# Patient Record
Sex: FEMALE | Race: WHITE | NOT HISPANIC OR LATINO | Employment: PART TIME | URBAN - NONMETROPOLITAN AREA
[De-identification: names, ages, dates, MRNs, and addresses within clinical notes are randomized per-mention and may not be internally consistent; named-entity substitution may affect disease eponyms.]

---
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
## 2025-03-13 ENCOUNTER — HOSPITAL ENCOUNTER (OUTPATIENT)
Dept: RADIOLOGY | Facility: CLINIC | Age: 20
Discharge: HOME | End: 2025-03-13
Payer: COMMERCIAL

## 2025-03-13 ENCOUNTER — OFFICE VISIT (OUTPATIENT)
Dept: URGENT CARE | Facility: CLINIC | Age: 20
End: 2025-03-13
Payer: COMMERCIAL

## 2025-03-13 VITALS
OXYGEN SATURATION: 98 % | DIASTOLIC BLOOD PRESSURE: 78 MMHG | HEART RATE: 79 BPM | RESPIRATION RATE: 15 BRPM | TEMPERATURE: 97.2 F | SYSTOLIC BLOOD PRESSURE: 116 MMHG

## 2025-03-13 DIAGNOSIS — R11.10 INTERMITTENT VOMITING: ICD-10-CM

## 2025-03-13 DIAGNOSIS — R10.2 PELVIC PAIN IN FEMALE: ICD-10-CM

## 2025-03-13 DIAGNOSIS — N83.202 LEFT OVARIAN CYST: ICD-10-CM

## 2025-03-13 DIAGNOSIS — K76.0 HEPATIC STEATOSIS: ICD-10-CM

## 2025-03-13 DIAGNOSIS — N92.6 IRREGULAR MENSES: ICD-10-CM

## 2025-03-13 DIAGNOSIS — R10.2 PELVIC PAIN IN FEMALE: Primary | ICD-10-CM

## 2025-03-13 LAB — PREGNANCY TEST URINE, POC: NEGATIVE

## 2025-03-13 PROCEDURE — 76705 ECHO EXAM OF ABDOMEN: CPT | Performed by: STUDENT IN AN ORGANIZED HEALTH CARE EDUCATION/TRAINING PROGRAM

## 2025-03-13 PROCEDURE — 76856 US EXAM PELVIC COMPLETE: CPT

## 2025-03-13 PROCEDURE — 99214 OFFICE O/P EST MOD 30 MIN: CPT | Mod: 25 | Performed by: PHYSICIAN ASSISTANT

## 2025-03-13 PROCEDURE — 76705 ECHO EXAM OF ABDOMEN: CPT

## 2025-03-13 PROCEDURE — 81025 URINE PREGNANCY TEST: CPT | Performed by: PHYSICIAN ASSISTANT

## 2025-03-13 RX ORDER — PROMETHAZINE HYDROCHLORIDE AND DEXTROMETHORPHAN HYDROBROMIDE 6.25; 15 MG/5ML; MG/5ML
5 SYRUP ORAL 4 TIMES DAILY PRN
Qty: 118 ML | Refills: 0 | Status: SHIPPED | OUTPATIENT
Start: 2025-03-13 | End: 2025-03-20

## 2025-03-13 NOTE — PROGRESS NOTES
[facility] URGENT CARE KYLE NOTE:      Name: Jacques Wyatt, 19 y.o.    CSN:[id number]   MRN:[id number]    PCP: RUBIO John-CNP    ALL:    Allergies   Allergen Reactions    Propranolol Dizziness       History:    Chief Complaint: sinus congestion, Sore Throat, and Vomiting (X this morning)    Encounter Date: 3/13/2025  6:12 PM      HPI: The history was obtained from the patient. Jacques is a 19 y.o. female, who presents with a chief complaint of sore throat, sinus congestion, headache, myalgia, and fatigue since last night, worse today. Took a dose of Dayquil today with some improvement. No known sick contacts. Denies dysphagia, sinus pain, ear pain, vision changes, cough, chest congestion, chest pain, and dyspnea.     She reports 4 episodes of non-bloody emesis today with some lower abdominal pain but denies stooling changes. Nausea mostly resolved. She states she has spontaneous emesis occasionally accompanied by vague abdominal pain every few weeks since 5 months ago (per record she's had presentations for N/V since 2023).      It is usually precipitated by eating or seeing food, but she denies specific food/food group triggers and she also has some effect when she's fasting. She was seen for vomiting once in the ED 10/2024 and referred to GI at [facility], but she did not follow-up as there was no call placed to her despite her attempting to reach out to the GI specialist at [facility].     She does endorse smoking marijuana every night to help with insomnia and taking 400-800mg ibuprofen per day for chronic headaches. She denies black tarry stools, hematochezia or melena.    No history of abdominal surgeries. Admits to irregular periods. Has not yet seen an OBGYN & isn't sexually active. She was on Depo-provera, but her mother encouraged her to discontinue given the SE profile. She had her last injection at the end of 2024, she spotted for 2d in February, but she hasn't had a 5d menstrual  cycle yet.     In the last year, she was able to lose 50lbs & she is observing her diet, drinking 2% milk & she's occasionally eating sandwiches with cheddar cheese singles. She does eat more chicken than beef.     PMHx:    Past Medical History:   Diagnosis Date    Allergic conjunctivitis of both eyes 02/10/2023    Allergies 02/10/2023    Cannabis use disorder     Insomnia               Current Outpatient Medications   Medication Sig Dispense Refill    acetaminophen (TylenoL) 325 mg tablet Take 1 tablet (325 mg) by mouth. (Patient not taking: Reported on 3/13/2025)      fluticasone (Flonase) 50 mcg/actuation nasal spray Administer 1 spray into each nostril once daily. Shake gently. Before first use, prime pump. After use, clean tip and replace cap. (Patient not taking: Reported on 3/13/2025) 16 g 0    medroxyprogesterone acetate (DEPO-PROVERA IM) Inject into the shoulder, thigh, or buttocks. (Patient not taking: Reported on 3/13/2025)      naproxen (Naprosyn) 500 mg tablet Take 1 tablet (500 mg) by mouth 2 times daily (morning and late afternoon). (Patient not taking: Reported on 3/13/2025)      pantoprazole (ProtoNix) 40 mg EC tablet Take 1 tablet (40 mg) by mouth once daily. Do not crush, chew, or split. (Patient not taking: Reported on 3/13/2025) 30 tablet 5    promethazine-DM (Phenergan-DM) 6.25-15 mg/5 mL syrup Take 5 mL by mouth 4 times a day as needed for cough for up to 7 days. 118 mL 0     No current facility-administered medications for this visit.         PMSx:    Past Surgical History:   Procedure Laterality Date    OTHER SURGICAL HISTORY  08/13/2021    Tonsillectomy    OTHER SURGICAL HISTORY  08/13/2021    Adenoidectomy    OTHER SURGICAL HISTORY  04/08/2022    [place] tooth extraction       Fam Hx:   Family History   Problem Relation Name Age of Onset    Other (cerebrovascular acciden) Other grandfather     Hypertension Other grandfather     Diabetes Other grandmother     Hypertension Other grandmother      Skin cancer Other grandmother     Hypertension Other aunt     Hypertension Other uncle        SOC. Hx:     Social History     Socioeconomic History    Marital status: Single     Spouse name: Not on file    Number of children: Not on file    Years of education: Not on file    Highest education level: Not on file   Occupational History    Not on file   Tobacco Use    Smoking status: Never    Smokeless tobacco: Never   Vaping Use    Vaping status: Every Day    Substances: Nicotine    Devices: Disposable   Substance and Sexual Activity    Alcohol use: Never    Drug use: Yes     Types: Marijuana    Sexual activity: Not Currently   Other Topics Concern    Not on file   Social History Narrative    Not on file     Social Drivers of Health     Financial Resource Strain: Not on file   Food Insecurity: Not on file   Transportation Needs: Not on file   Physical Activity: Not on file   Stress: Not on file   Social Connections: Not on file   Intimate Partner Violence: Not on file   Housing Stability: Not on file         Vitals:    03/13/25 1015   BP: 116/78   Pulse: 79   Resp: 15   Temp: 36.2 °C (97.2 °F)   SpO2: 98%                Physical Exam  Vitals reviewed.   Constitutional:       Appearance: Normal appearance. She is normal weight.   HENT:      Head: Normocephalic and atraumatic.      Right Ear: Ear canal and external ear normal.      Left Ear: Ear canal and external ear normal.      Ears:      Comments: TM not visualized d/t cerumen     Nose: Nose normal.      Comments: No sinus tenderness     Mouth/Throat:      Mouth: Mucous membranes are moist.      Pharynx: No oropharyngeal exudate.      Comments: Minimal erythema of the posterior oropharynx  Eyes:      General: No scleral icterus.     Conjunctiva/sclera: Conjunctivae normal.   Cardiovascular:      Rate and Rhythm: Normal rate and regular rhythm.      Heart sounds: Normal heart sounds.   Pulmonary:      Effort: Pulmonary effort is normal.      Breath sounds: Normal  breath sounds.   Abdominal:      General: Bowel sounds are normal. There is no distension.      Palpations: Abdomen is soft. There is no mass.      Tenderness: There is abdominal tenderness in the suprapubic area and left lower quadrant. There is no guarding or rebound.      Comments: Negative heel strike  Negative peritoneal signs   Musculoskeletal:         General: Normal range of motion.      Cervical back: Normal range of motion.   Skin:     General: Skin is warm and dry.      Capillary Refill: Capillary refill takes less than 2 seconds.      Coloration: Skin is not jaundiced.   Neurological:      Mental Status: She is alert and oriented to person, place, and time.   Psychiatric:         Mood and Affect: Mood normal.         Behavior: Behavior normal.           LABORATORY @ RADIOLOGICAL IMAGING (if done):     Results for orders placed or performed in visit on 03/13/25 (from the past 24 hours)   POCT pregnancy, urine manually resulted   Result Value Ref Range    Preg Test, Ur Negative Negative   Interpreted By:  Cachorro Katz,   STUDY:  US PELVIS; ;  3/13/2025 12:02 pm      INDICATION:  Signs/Symptoms:pelvic pain, irregular menses.      COMPARISON:  None.      ACCESSION NUMBER(S):  [id number]      ORDERING CLINICIAN:  SOFYA ORDOÑEZ      TECHNIQUE:  Multiple transabdominal sonographic images of the pelvis were  performed. The patient declined transvaginal imaging.      FINDINGS:  The uterus measures 8.0 x 2.3 x 3.8 cm in diameter. The double wall  endometrial thickness is 4 mm. There is no fluid distention of the  endometrial cavity. There is no sign of uterine mass.      There is no evidence of free pelvic fluid.      The right ovary measures 2.6 x 1.4 x 2.8 cm in diameter. There is no  right ovarian mass. Doppler interrogation of the right ovary  demonstrates normal arterial activity.      The left ovary measures 3.4 x 2.0 x 2.5 cm in diameter and contains a  1.5 x 1.1 x 1.9 cm cyst or follicle as  well as multiple smaller  follicles. Doppler interrogation of the left ovary demonstrates  normal arterial activity.      IMPRESSION:  1.9 cm left ovarian cyst or follicle.      The remainder of the pelvis is unremarkable.          MACRO:  None      Signed by: Cachorro Katz 3/13/2025 12:35 PM  Dictation workstation:   [id number]      Interpreted By:  Jake Cunningham,   STUDY:  US ABDOMEN LIMITED;  3/13/2025 11:36 am      INDICATION:  Signs/Symptoms:RUQ/epigastric pain with frequent nausea/vomiting.      ,R11.10 Vomiting, unspecified      COMPARISON:  None.      ACCESSION NUMBER(S):  [id number]      ORDERING CLINICIAN:  SOFYA ORDOÑEZ      TECHNIQUE:  Multiple images of the right upper quadrant were obtained.      FINDINGS:  LIVER:  The liver measures 17.5 cm. Slightly heterogenous hepatic  echotexture. No gross lesions          GALLBLADDER:  The gallbladder is nondistended, and demonstrates no evidence of  gallstones, wall thickening or surrounding fluid. The gallbladder  wall thickness is 0.3 cm. Sonographic Durham's sign is negative.          BILE DUCTS:  No evidence of intra or extrahepatic biliary dilatation is  identified; the common bile duct measures 0.2 cm .      PANCREAS:  The pancreas is poorly visualized due to overlying bowel gas.      RIGHT KIDNEY:  The right kidney measures 11.1 cm in length. The renal cortical  echogenicity and thickness are within normal limit.  No  hydronephrosis or renal calculi are seen.      IMPRESSION:  1. No acute right upper quadrant pathology. Sub visualization of the  pancreas.  2. Slightly heterogenous hepatic echotexture could be from underlying  steatosis. Advise biochemical correlation.      MACRO:  None      Signed by: Jake Cunningham 3/13/2025 11:59 AM  Dictation workstation:   [id number]  ____________________________________________________________________    I did personally review Jacques's past medical history, surgical history, social history, as well as  family history (when relevant).  In this case, I also oversaw the her drug management by reviewing her medication list, allergy list, as well as the medications that I prescribed during the UC course and/or recommended as an out-patient (including possible OTC medications such as acetaminophen, NSAIDs , etc).    After reviewing the items above, I did look at previous medical documentation, such as recent hospitalizations, office visits, and/or recent consultations with PCP/specialist.                          SDOH:   Another factor that I considered in Jacques's care was her Social Determinants of Health (SDOH). During this UC encounter, she did not have social determinants of health. Those SDOH influencing Jacques's care are: none      UC COURSE/MEDICAL DECISION MAKING:    Jacques is a 19 y.o., who presents with a working diagnosis of   1. Pelvic pain in female    2. Intermittent vomiting    3. Irregular menses    4. Left ovarian cyst    5. Hepatic steatosis     with a differential to include: cannabinoid hyperemesis syndrome, peptic ulcer, cholelithiasis/biliary colic, hiatal hernia, PCOS, electrolyte abnormalities, migraine, UTI    Plan:   Her initial presentation was due to respiratory illness which seems to be more viral in nature, she was reassured on this exam and provided some antiemetic antitussive treatment options for her.  In regards to her intermittent vomiting, nonspecific pelvic pain, irregular menses we did consider the possibility of PCOS and perform pelvic ultrasound, will also await some of the lab testing has been ordered for this result.  She might benefit from consult with OB/GYN, in the past she did have some insulin resistance and prediabetes noted on the A1c which overall has improved.  She does not possess any signs of hyperandrogenism which is consistent with hirsutism, acne, weight gain, hair loss or hair thinning.  Irregular menses can be managed and monitored by OB/GYN, she is negative for  pregnancy today, ultrasound did show some left sided ovarian cyst could be follicular in nature, but the patient did have a last menstrual cycle 2 days in February, she does and has received Depo in the of 2024 so this may also influence some of her menstrual cycle to date  The cyst is nonspecific but with the findings of an elevated BMI, slight acne formation I do think consideration to PCOS must be considered, she may benefit from follow through with gynecology  Hepatitic steatosis: recommend reduction in fat laden foods (instead of 2% milk = skim milk & reduce fat foods), discussed importance of keeping appointments with GP to discuss ongoing monitoring and/or care for this finding      I, JESUS Lamb student, helped prepare the medical record for my supervising clinician, Greyson Mota PA-C.   LOGA Lamb, [facility]    Supervised by  Greyson Mota PA-C   Advanced Practice Provider  [facility] URGENT CARE    I was present with the JESUS hong who participated in the documentation of this note. I have personally seen and re-examined the patient and performed the medical decision-making components (assessment and plan of care). I have reviewed the PA student documentation and verified the findings in the note as written with additions or exceptions as stated in the body of this note.    Greyson Mota PA-C, OLGA

## 2025-03-13 NOTE — LETTER
March 13, 2025     Patient: Jacques Wyatt   YOB: 2005   Date of Visit: 3/13/2025       To Whom It May Concern:    It is my medical opinion that Jacques Wyatt may return to work on 03/14/25.    If you have any questions or concerns, please don't hesitate to call.         Sincerely,        Greyson Mota PA-C    CC: No Recipients

## 2025-03-14 LAB
ALBUMIN SERPL-MCNC: 4.8 G/DL (ref 3.6–5.1)
ALP SERPL-CCNC: 67 U/L (ref 36–128)
ALT SERPL-CCNC: 15 U/L (ref 5–32)
ANION GAP SERPL CALCULATED.4IONS-SCNC: 13 MMOL/L (CALC) (ref 7–17)
AST SERPL-CCNC: 17 U/L (ref 12–32)
BASOPHILS # BLD AUTO: 60 CELLS/UL (ref 0–200)
BASOPHILS NFR BLD AUTO: 0.6 %
BILIRUB SERPL-MCNC: 1.1 MG/DL (ref 0.2–1.1)
BUN SERPL-MCNC: 7 MG/DL (ref 7–20)
CALCIUM SERPL-MCNC: 9.7 MG/DL (ref 8.9–10.4)
CHLORIDE SERPL-SCNC: 107 MMOL/L (ref 98–110)
CO2 SERPL-SCNC: 21 MMOL/L (ref 20–32)
CREAT SERPL-MCNC: 0.66 MG/DL (ref 0.5–0.96)
EGFRCR SERPLBLD CKD-EPI 2021: 130 ML/MIN/1.73M2
EOSINOPHIL # BLD AUTO: 90 CELLS/UL (ref 15–500)
EOSINOPHIL NFR BLD AUTO: 0.9 %
ERYTHROCYTE [DISTWIDTH] IN BLOOD BY AUTOMATED COUNT: 13.5 % (ref 11–15)
GLUCOSE SERPL-MCNC: 81 MG/DL (ref 65–99)
HCT VFR BLD AUTO: 40.4 % (ref 35–45)
HGB BLD-MCNC: 13.4 G/DL (ref 11.7–15.5)
LIPASE SERPL-CCNC: 27 U/L (ref 7–60)
LYMPHOCYTES # BLD AUTO: 2010 CELLS/UL (ref 850–3900)
LYMPHOCYTES NFR BLD AUTO: 20.1 %
MAGNESIUM SERPL-MCNC: 2.1 MG/DL (ref 1.5–2.5)
MCH RBC QN AUTO: 27.6 PG (ref 27–33)
MCHC RBC AUTO-ENTMCNC: 33.2 G/DL (ref 32–36)
MCV RBC AUTO: 83.3 FL (ref 80–100)
MONOCYTES # BLD AUTO: 760 CELLS/UL (ref 200–950)
MONOCYTES NFR BLD AUTO: 7.6 %
NEUTROPHILS # BLD AUTO: 7080 CELLS/UL (ref 1500–7800)
NEUTROPHILS NFR BLD AUTO: 70.8 %
PLATELET # BLD AUTO: 319 THOUSAND/UL (ref 140–400)
PMV BLD REES-ECKER: 11.1 FL (ref 7.5–12.5)
POTASSIUM SERPL-SCNC: 4.1 MMOL/L (ref 3.8–5.1)
PROT SERPL-MCNC: 8 G/DL (ref 6.3–8.2)
RBC # BLD AUTO: 4.85 MILLION/UL (ref 3.8–5.1)
SODIUM SERPL-SCNC: 141 MMOL/L (ref 135–146)
TESTOST SERPL-MCNC: NORMAL NG/DL
WBC # BLD AUTO: 10 THOUSAND/UL (ref 3.8–10.8)

## 2025-03-15 LAB
APPEARANCE UR: CLEAR
BACTERIA UR CULT: NORMAL
BILIRUB UR QL STRIP: NEGATIVE
COLOR UR: YELLOW
GLUCOSE UR QL STRIP: NEGATIVE
HGB UR QL STRIP: NEGATIVE
KETONES UR QL STRIP: NEGATIVE
LEUKOCYTE ESTERASE UR QL STRIP: NEGATIVE
NITRITE UR QL STRIP: NEGATIVE
PH UR STRIP: 7 [PH] (ref 5–8)
PROT UR QL STRIP: NEGATIVE
SP GR UR STRIP: 1.01 (ref 1–1.03)

## 2025-03-18 LAB
ALBUMIN SERPL-MCNC: 4.8 G/DL (ref 3.6–5.1)
ALP SERPL-CCNC: 67 U/L (ref 36–128)
ALT SERPL-CCNC: 15 U/L (ref 5–32)
ANION GAP SERPL CALCULATED.4IONS-SCNC: 13 MMOL/L (CALC) (ref 7–17)
AST SERPL-CCNC: 17 U/L (ref 12–32)
BILIRUB SERPL-MCNC: 1.1 MG/DL (ref 0.2–1.1)
BUN SERPL-MCNC: 7 MG/DL (ref 7–20)
CALCIUM SERPL-MCNC: 9.7 MG/DL (ref 8.9–10.4)
CHLORIDE SERPL-SCNC: 107 MMOL/L (ref 98–110)
CO2 SERPL-SCNC: 21 MMOL/L (ref 20–32)
CREAT SERPL-MCNC: 0.66 MG/DL (ref 0.5–0.96)
EGFRCR SERPLBLD CKD-EPI 2021: 130 ML/MIN/1.73M2
GLUCOSE SERPL-MCNC: 81 MG/DL (ref 65–99)
LIPASE SERPL-CCNC: 27 U/L (ref 7–60)
MAGNESIUM SERPL-MCNC: 2.1 MG/DL (ref 1.5–2.5)
POTASSIUM SERPL-SCNC: 4.1 MMOL/L (ref 3.8–5.1)
PROT SERPL-MCNC: 8 G/DL (ref 6.3–8.2)
SODIUM SERPL-SCNC: 141 MMOL/L (ref 135–146)
TESTOST SERPL-MCNC: 29 NG/DL (ref 2–45)

## 2025-03-19 ENCOUNTER — TELEPHONE (OUTPATIENT)
Dept: URGENT CARE | Facility: CLINIC | Age: 20
End: 2025-03-19
Payer: COMMERCIAL

## 2025-03-19 NOTE — TELEPHONE ENCOUNTER
Result Communication    Resulted Orders   POCT pregnancy, urine manually resulted   Result Value Ref Range    Preg Test, Ur Negative Negative   Comprehensive metabolic panel   Result Value Ref Range    GLUCOSE 81 65 - 99 mg/dL      Comment:                    Fasting reference interval         UREA NITROGEN (BUN) 7 7 - 20 mg/dL    CREATININE 0.66 0.50 - 0.96 mg/dL    EGFR 130 > OR = 60 mL/min/1.73m2    SODIUM 141 135 - 146 mmol/L    POTASSIUM 4.1 3.8 - 5.1 mmol/L    CHLORIDE 107 98 - 110 mmol/L    CARBON DIOXIDE 21 20 - 32 mmol/L    ELECTROLYTE BALANCE 13 7 - 17 mmol/L (calc)    CALCIUM 9.7 8.9 - 10.4 mg/dL    PROTEIN, TOTAL 8.0 6.3 - 8.2 g/dL    ALBUMIN 4.8 3.6 - 5.1 g/dL    BILIRUBIN, TOTAL 1.1 0.2 - 1.1 mg/dL    ALKALINE PHOSPHATASE 67 36 - 128 U/L    AST 17 12 - 32 U/L    ALT 15 5 - 32 U/L   Magnesium   Result Value Ref Range    MAGNESIUM 2.1 1.5 - 2.5 mg/dL   Lipase   Result Value Ref Range    LIPASE 27 7 - 60 U/L   Testosterone, Total LC-MS/MS   Result Value Ref Range    TESTOSTERONE, TOTAL, MS 29 2 - 45 ng/dL      Comment:         For additional information, please refer to  http://education.[employer]/faq/  [email or web address]  (This link is being provided for informational/  educational purposes only.)     This test was developed and its analytical performance  characteristics have been determined by [facility] [place], [place]. It has  not been cleared or approved by the U.S. Food and Drug  Administration. This assay has been validated pursuant  to the CLIA regulations and is used for clinical  purposes.        CBC and Auto Differential   Result Value Ref Range    WHITE BLOOD CELL COUNT 10.0 3.8 - 10.8 Thousand/uL    RED BLOOD CELL COUNT 4.85 3.80 - 5.10 Million/uL    HEMOGLOBIN 13.4 11.7 - 15.5 g/dL    HEMATOCRIT 40.4 35.0 - 45.0 %    MCV 83.3 80.0 - 100.0 fL    MCH 27.6 27.0 - 33.0 pg    MCHC 33.2 32.0 - 36.0 g/dL      Comment:      For adults, a slight decrease  in the calculated MCHC  value (in the range of 30 to 32 g/dL) is most likely  not clinically significant; however, it should be  interpreted with caution in correlation with other  red cell parameters and the patient's clinical  condition.      RDW 13.5 11.0 - 15.0 %    PLATELET COUNT 319 140 - 400 Thousand/uL    MPV 11.1 7.5 - 12.5 fL    ABSOLUTE NEUTROPHILS 7,080 1,500 - 7,800 cells/uL    ABSOLUTE LYMPHOCYTES 2,010 850 - 3,900 cells/uL    ABSOLUTE MONOCYTES 760 200 - 950 cells/uL    ABSOLUTE EOSINOPHILS 90 15 - 500 cells/uL    ABSOLUTE BASOPHILS 60 0 - 200 cells/uL    NEUTROPHILS 70.8 %    LYMPHOCYTES 20.1 %    MONOCYTES 7.6 %    EOSINOPHILS 0.9 %    BASOPHILS 0.6 %   Urinalysis with Reflex Microscopic   Result Value Ref Range    COLOR YELLOW YELLOW    APPEARANCE CLEAR CLEAR    SPECIFIC GRAVITY 1.013 1.001 - 1.035    PH 7.0 5.0 - 8.0    GLUCOSE NEGATIVE NEGATIVE    BILIRUBIN NEGATIVE NEGATIVE    KETONES NEGATIVE NEGATIVE    OCCULT BLOOD NEGATIVE NEGATIVE    PROTEIN NEGATIVE NEGATIVE    NITRITE NEGATIVE NEGATIVE    LEUKOCYTE ESTERASE NEGATIVE NEGATIVE   Urine Culture   Result Value Ref Range    CULTURE, URINE, ROUTINE SEE NOTE       Comment:          CULTURE, URINE, ROUTINE         Micro Number:      [id number]    Test Status:       Final    Specimen Source:   Urine    Specimen Quality:  Adequate    Result:            No Growth                                               We received a preserved urine culture transport                       tube with either no order indicated or a source                       which is inappropriate for the test requested. A                       urine culture was performed. If this is not what                       you intended to order, please contact your local                        immediately so that                       we can adjust our billing appropriately. You may                       also inquire about alternative or additional                        testing.         10:58 AM      Results were successfully communicated with the patient and they acknowledged their understanding. Reviewed fatty liver diet and exercise. Will follow-up with PCP

## 2025-07-09 ENCOUNTER — OFFICE VISIT (OUTPATIENT)
Age: 20
End: 2025-07-09
Payer: COMMERCIAL

## 2025-07-09 VITALS
HEART RATE: 84 BPM | BODY MASS INDEX: 38.88 KG/M2 | HEIGHT: 63 IN | DIASTOLIC BLOOD PRESSURE: 80 MMHG | SYSTOLIC BLOOD PRESSURE: 140 MMHG | WEIGHT: 219.44 LBS

## 2025-07-09 DIAGNOSIS — F32.A ANXIETY AND DEPRESSION: ICD-10-CM

## 2025-07-09 DIAGNOSIS — R11.15 INTRACTABLE CYCLICAL VOMITING WITH NAUSEA: ICD-10-CM

## 2025-07-09 DIAGNOSIS — R11.0 DAILY NAUSEA: ICD-10-CM

## 2025-07-09 DIAGNOSIS — J45.20 MILD INTERMITTENT ASTHMA WITHOUT COMPLICATION (HHS-HCC): ICD-10-CM

## 2025-07-09 DIAGNOSIS — F41.9 ANXIETY AND DEPRESSION: ICD-10-CM

## 2025-07-09 DIAGNOSIS — Z13.29 SCREENING FOR THYROID DISORDER: ICD-10-CM

## 2025-07-09 DIAGNOSIS — Z13.220 SCREENING FOR LIPID DISORDERS: ICD-10-CM

## 2025-07-09 DIAGNOSIS — M25.512 ACUTE PAIN OF LEFT SHOULDER: Primary | ICD-10-CM

## 2025-07-09 DIAGNOSIS — R23.3 ABNORMAL BRUISING: ICD-10-CM

## 2025-07-09 DIAGNOSIS — E66.09 CLASS 2 OBESITY DUE TO EXCESS CALORIES WITHOUT SERIOUS COMORBIDITY WITH BODY MASS INDEX (BMI) OF 38.0 TO 38.9 IN ADULT: ICD-10-CM

## 2025-07-09 DIAGNOSIS — Z13.1 SCREENING FOR DIABETES MELLITUS (DM): ICD-10-CM

## 2025-07-09 DIAGNOSIS — E66.812 CLASS 2 OBESITY DUE TO EXCESS CALORIES WITHOUT SERIOUS COMORBIDITY WITH BODY MASS INDEX (BMI) OF 38.0 TO 38.9 IN ADULT: ICD-10-CM

## 2025-07-09 PROCEDURE — 3008F BODY MASS INDEX DOCD: CPT | Performed by: NURSE PRACTITIONER

## 2025-07-09 PROCEDURE — 99214 OFFICE O/P EST MOD 30 MIN: CPT | Performed by: NURSE PRACTITIONER

## 2025-07-09 RX ORDER — PANTOPRAZOLE SODIUM 40 MG/1
40 TABLET, DELAYED RELEASE ORAL DAILY
Qty: 30 TABLET | Refills: 2 | Status: SHIPPED | OUTPATIENT
Start: 2025-07-09

## 2025-07-09 NOTE — LETTER
[place] [address]  [facility] [address]  Dept: [phone]                  Please allow Jacques Wyatt to leave work early due to need for testing tomorrow, Thursday July 10, 2025.              Please do not hesitate to contact my office with any questions.           Sincerely,           RUBIO Guevara-CNP

## 2025-07-09 NOTE — PROGRESS NOTES
"Subjective   Patient ID: Jacques Wyatt is a 20 y.o. female who presents for Nausea and Vomiting (Nausea and vomiting still going on      vomiting every day    middle of back stinging pain     left shoulder sore  down arm and under arm    abdominal pain mid started today    spider veins in legs,  legs throb ).    HPI   Jacques returns with having several concerns to discuss today.      N&V: started with headaches. But headaches slowed down after stopped working at "[employer]. Went to [facility] ER for back pain/head pain. Was supposed to go to neurologist. Everyday throws up. When she \"thinks about food, looks at food,\" she will feel nauseated and will throw up. She states, \"I am not doing anything to make my self throw up, like gagging myself or anything. I just get nauseated and then I throw up. Sometimes it can be bile or sometimes it can be what I drank. I am not really eating much, just drinking coffee and water.\"   Admits to smoking marijuana everyday.  Saw march 2025 urgent care at [facility]- she was prescribed a PPI which she has not taken. She states, \"I have a lot going on with getting rid of my roommate and my dad, and my job and I just left the medicine in my car and didn't take it.\"     Back pain: middle back pain. Piercing back pain. \"Everyday/preston back pain.\" Middle of back.     Left shoulder pain: woke up and started having pain in the left shoulder. Having difficulty fulling extending arm, as well as she noticed a red spot appear on the top of her shoulder which had not noticed prior to the pain starting.       Spider veins: she is also concerned as she is starting to have leg pain bilaterally. And have \"spider veins.\" We discussed with her commended weight loss, to continue with that. That she should also wear compression socks to help keep compression on her legs while she is working throughout the day.     Review of Systems   Constitutional:  Positive for fatigue.   HENT: Negative.   " "  Respiratory: Negative.     Gastrointestinal:  Positive for nausea and vomiting.   Genitourinary: Negative.    Musculoskeletal:  Positive for arthralgias, back pain and myalgias.   Skin: Negative.    Neurological: Negative.    Hematological:  Bruises/bleeds easily.   Psychiatric/Behavioral:  Positive for dysphoric mood and sleep disturbance. Negative for suicidal ideas. The patient is nervous/anxious.        Objective   /80   Pulse 84   Ht 1.6 m (5' 3\")   Wt 99.5 kg (219 lb 7 oz)   BMI 38.87 kg/m²     Physical Exam  Vitals and nursing note reviewed.   Constitutional:       Appearance: Normal appearance.   HENT:      Head: Normocephalic and atraumatic.   Cardiovascular:      Rate and Rhythm: Normal rate.      Pulses: Normal pulses.   Musculoskeletal:      Left shoulder: Tenderness present. Decreased range of motion.        Arms:       Thoracic back: Spasms present.        Back:    Skin:     General: Skin is warm and dry.   Neurological:      Mental Status: She is alert.         Assessment/Plan   Problem List Items Addressed This Visit           ICD-10-CM    Anxiety and depression F41.9, F32.A    We discussed that since her anxiety/depression has gotten worse, that she should consider going back to counseling.   She is not interested in taking medication at this time.         Daily nausea R11.0    We discussed trial of pantoprazole for at least 8 weeks to determine if her symptoms are improving.   We also discussed she should try to avoid smoking marijuana to see if that may also help with her symptoms.          Relevant Medications    pantoprazole (ProtoNix) 40 mg EC tablet    Mild intermittent asthma without complication ([facility]) J45.20    stable         Class 2 obesity due to excess calories without serious comorbidity with body mass index (BMI) of 38.0 to 38.9 in adult E66.812, E66.09, Z68.38    Intractable cyclical vomiting with nausea R11.15     Other Visit Diagnoses         Codes      Acute pain of " left shoulder    -  Primary M25.512    Relevant Orders    XR shoulder left 2+ views (Completed)      Abnormal bruising     R23.3    Relevant Orders    Vitamin B12 (Completed)    CBC and Auto Differential (Completed)    Vitamin D 25-Hydroxy,Total (for eval of Vitamin D levels) (Completed)      Screening for thyroid disorder     Z13.29    Relevant Orders    TSH with reflex to Free T4 if abnormal (Completed)      Screening for diabetes mellitus (DM)     Z13.1    Relevant Orders    Hemoglobin A1C (Completed)      Screening for lipid disorders     Z13.220    Relevant Orders    Lipid Panel (Completed)             Follow up at least yearly. We will contact her with results of xray and lab work and refer as necessary.     For any new medications that were prescribed today, the patient was educated about their indications for use, administration, frequency and potential side effects of the medication.

## 2025-07-09 NOTE — LETTER
July 9, 2025     Patient: Jacques Wyatt   YOB: 2005   Date of Visit: 7/9/2025       To Whom It May Concern:    Jacques Wyatt was seen in my clinic on 7/9/2025 at 4:00 pm. Please excuse Jacques for her absence from work on this day to make the appointment.    If you have any questions or concerns, please don't hesitate to call.         Sincerely,         Maris Smith, RUBIO-CNP        CC: No Recipients

## 2025-07-10 ENCOUNTER — HOSPITAL ENCOUNTER (OUTPATIENT)
Dept: RADIOLOGY | Facility: CLINIC | Age: 20
Discharge: HOME | End: 2025-07-10
Payer: COMMERCIAL

## 2025-07-10 DIAGNOSIS — M25.512 ACUTE PAIN OF LEFT SHOULDER: ICD-10-CM

## 2025-07-10 PROCEDURE — 73030 X-RAY EXAM OF SHOULDER: CPT | Mod: LEFT SIDE | Performed by: RADIOLOGY

## 2025-07-10 PROCEDURE — 73030 X-RAY EXAM OF SHOULDER: CPT | Mod: LT

## 2025-07-11 LAB
25(OH)D3+25(OH)D2 SERPL-MCNC: 37 NG/ML (ref 30–100)
BASOPHILS # BLD AUTO: 47 CELLS/UL (ref 0–200)
BASOPHILS NFR BLD AUTO: 0.6 %
CHOLEST SERPL-MCNC: 155 MG/DL
CHOLEST/HDLC SERPL: 3.6 (CALC)
EOSINOPHIL # BLD AUTO: 47 CELLS/UL (ref 15–500)
EOSINOPHIL NFR BLD AUTO: 0.6 %
ERYTHROCYTE [DISTWIDTH] IN BLOOD BY AUTOMATED COUNT: 13.6 % (ref 11–15)
EST. AVERAGE GLUCOSE BLD GHB EST-MCNC: 105 MG/DL
EST. AVERAGE GLUCOSE BLD GHB EST-SCNC: 5.8 MMOL/L
HBA1C MFR BLD: 5.3 %
HCT VFR BLD AUTO: 40 % (ref 35–45)
HDLC SERPL-MCNC: 43 MG/DL
HGB BLD-MCNC: 12.4 G/DL (ref 11.7–15.5)
LDLC SERPL CALC-MCNC: 95 MG/DL (CALC)
LYMPHOCYTES # BLD AUTO: 2543 CELLS/UL (ref 850–3900)
LYMPHOCYTES NFR BLD AUTO: 32.6 %
MCH RBC QN AUTO: 27.4 PG (ref 27–33)
MCHC RBC AUTO-ENTMCNC: 31 G/DL (ref 32–36)
MCV RBC AUTO: 88.3 FL (ref 80–100)
MONOCYTES # BLD AUTO: 515 CELLS/UL (ref 200–950)
MONOCYTES NFR BLD AUTO: 6.6 %
NEUTROPHILS # BLD AUTO: 4649 CELLS/UL (ref 1500–7800)
NEUTROPHILS NFR BLD AUTO: 59.6 %
NONHDLC SERPL-MCNC: 112 MG/DL (CALC)
PLATELET # BLD AUTO: 301 THOUSAND/UL (ref 140–400)
PMV BLD REES-ECKER: 10.7 FL (ref 7.5–12.5)
RBC # BLD AUTO: 4.53 MILLION/UL (ref 3.8–5.1)
TRIGL SERPL-MCNC: 79 MG/DL
TSH SERPL-ACNC: 0.99 MIU/L
VIT B12 SERPL-MCNC: 395 PG/ML (ref 200–1100)
WBC # BLD AUTO: 7.8 THOUSAND/UL (ref 3.8–10.8)

## 2025-07-13 PROBLEM — R11.15 INTRACTABLE CYCLICAL VOMITING WITH NAUSEA: Status: ACTIVE | Noted: 2025-07-13

## 2025-07-13 PROBLEM — E66.09 CLASS 2 OBESITY DUE TO EXCESS CALORIES WITHOUT SERIOUS COMORBIDITY WITH BODY MASS INDEX (BMI) OF 38.0 TO 38.9 IN ADULT: Status: ACTIVE | Noted: 2025-07-09

## 2025-07-13 PROBLEM — E66.812 CLASS 2 OBESITY DUE TO EXCESS CALORIES WITHOUT SERIOUS COMORBIDITY WITH BODY MASS INDEX (BMI) OF 38.0 TO 38.9 IN ADULT: Status: ACTIVE | Noted: 2025-07-09

## 2025-07-13 ASSESSMENT — ENCOUNTER SYMPTOMS
BACK PAIN: 1
FATIGUE: 1
NAUSEA: 1
BRUISES/BLEEDS EASILY: 1
RESPIRATORY NEGATIVE: 1
ARTHRALGIAS: 1
VOMITING: 1
DYSPHORIC MOOD: 1
NERVOUS/ANXIOUS: 1
SLEEP DISTURBANCE: 1
MYALGIAS: 1
NEUROLOGICAL NEGATIVE: 1

## 2025-07-13 NOTE — ASSESSMENT & PLAN NOTE
We discussed that since her anxiety/depression has gotten worse, that she should consider going back to counseling.   She is not interested in taking medication at this time.

## 2025-07-13 NOTE — ASSESSMENT & PLAN NOTE
We discussed trial of pantoprazole for at least 8 weeks to determine if her symptoms are improving.   We also discussed she should try to avoid smoking marijuana to see if that may also help with her symptoms.

## 2025-09-08 ENCOUNTER — APPOINTMENT (OUTPATIENT)
Dept: PRIMARY CARE | Facility: CLINIC | Age: 20
End: 2025-09-08
Payer: COMMERCIAL